# Patient Record
Sex: MALE | Race: WHITE | NOT HISPANIC OR LATINO | Employment: OTHER | URBAN - NONMETROPOLITAN AREA
[De-identification: names, ages, dates, MRNs, and addresses within clinical notes are randomized per-mention and may not be internally consistent; named-entity substitution may affect disease eponyms.]

---

## 2017-09-28 ENCOUNTER — COMMUNICATION - GICH (OUTPATIENT)
Dept: FAMILY MEDICINE | Facility: OTHER | Age: 62
End: 2017-09-28

## 2017-10-02 ENCOUNTER — HISTORY (OUTPATIENT)
Dept: FAMILY MEDICINE | Facility: OTHER | Age: 62
End: 2017-10-02

## 2017-10-02 ENCOUNTER — OFFICE VISIT - GICH (OUTPATIENT)
Dept: FAMILY MEDICINE | Facility: OTHER | Age: 62
End: 2017-10-02

## 2017-10-02 DIAGNOSIS — M25.50 PAIN IN JOINT: ICD-10-CM

## 2017-10-02 DIAGNOSIS — G60.9 HEREDITARY AND IDIOPATHIC NEUROPATHY: ICD-10-CM

## 2017-10-02 DIAGNOSIS — N42.9 DISORDER OF PROSTATE: ICD-10-CM

## 2017-10-02 DIAGNOSIS — F32.9 MAJOR DEPRESSIVE DISORDER, SINGLE EPISODE: ICD-10-CM

## 2017-10-02 DIAGNOSIS — M54.81 OCCIPITAL NEURALGIA: ICD-10-CM

## 2017-10-02 DIAGNOSIS — E55.9 VITAMIN D DEFICIENCY: ICD-10-CM

## 2017-10-02 DIAGNOSIS — F33.1 MAJOR DEPRESSIVE DISORDER, RECURRENT, MODERATE (H): ICD-10-CM

## 2017-10-02 DIAGNOSIS — R53.82 CHRONIC FATIGUE: ICD-10-CM

## 2017-10-02 LAB
A/G RATIO - HISTORICAL: 1.5 (ref 1–2)
ABSOLUTE BASOPHILS - HISTORICAL: 0 THOU/CU MM
ABSOLUTE EOSINOPHILS - HISTORICAL: 0.1 THOU/CU MM
ABSOLUTE IMMATURE GRANULOCYTES(METAS,MYELOS,PROS) - HISTORICAL: 0 THOU/CU MM
ABSOLUTE LYMPHOCYTES - HISTORICAL: 2.3 THOU/CU MM (ref 0.9–2.9)
ABSOLUTE MONOCYTES - HISTORICAL: 0.5 THOU/CU MM
ABSOLUTE NEUTROPHILS - HISTORICAL: 3.9 THOU/CU MM (ref 1.7–7)
ALBUMIN SERPL-MCNC: 4.7 G/DL (ref 3.5–5.7)
ALP SERPL-CCNC: 54 IU/L (ref 34–104)
ALT (SGPT) - HISTORICAL: 55 IU/L (ref 7–52)
ANION GAP - HISTORICAL: 11 (ref 5–18)
AST SERPL-CCNC: 29 IU/L (ref 13–39)
BASOPHILS # BLD AUTO: 0.6 %
BILIRUB SERPL-MCNC: 0.8 MG/DL (ref 0.3–1)
BUN SERPL-MCNC: 14 MG/DL (ref 7–25)
BUN/CREAT RATIO - HISTORICAL: 15
CALCIUM SERPL-MCNC: 10.1 MG/DL (ref 8.6–10.3)
CHLORIDE SERPLBLD-SCNC: 106 MMOL/L (ref 98–107)
CO2 SERPL-SCNC: 24 MMOL/L (ref 21–31)
CREAT SERPL-MCNC: 0.92 MG/DL (ref 0.7–1.3)
EOSINOPHIL NFR BLD AUTO: 1.9 %
ERYTHROCYTE [DISTWIDTH] IN BLOOD BY AUTOMATED COUNT: 11.6 % (ref 11.5–15.5)
GFR IF NOT AFRICAN AMERICAN - HISTORICAL: >60 ML/MIN/1.73M2
GLOBULIN - HISTORICAL: 3.2 G/DL (ref 2–3.7)
GLUCOSE SERPL-MCNC: 101 MG/DL (ref 70–105)
HCT VFR BLD AUTO: 44.1 % (ref 37–53)
HEMOGLOBIN: 16.1 G/DL (ref 13.5–17.5)
IMMATURE GRANULOCYTES(METAS,MYELOS,PROS) - HISTORICAL: 0.4 %
LYMPHOCYTES NFR BLD AUTO: 33.3 % (ref 20–44)
MCH RBC QN AUTO: 32.9 PG (ref 26–34)
MCHC RBC AUTO-ENTMCNC: 36.5 G/DL (ref 32–36)
MCV RBC AUTO: 90 FL (ref 80–100)
MONOCYTES NFR BLD AUTO: 6.6 %
NEUTROPHILS NFR BLD AUTO: 57.2 % (ref 42–72)
PLATELET # BLD AUTO: 210 THOU/CU MM (ref 140–440)
PMV BLD: 9.3 FL (ref 6.5–11)
POTASSIUM SERPL-SCNC: 4 MMOL/L (ref 3.5–5.1)
PROT SERPL-MCNC: 7.9 G/DL (ref 6.4–8.9)
PSA TOTAL (DIAGNOSTIC) - HISTORICAL: 0.57 NG/ML
RED BLOOD COUNT - HISTORICAL: 4.89 MIL/CU MM (ref 4.3–5.9)
SODIUM SERPL-SCNC: 141 MMOL/L (ref 133–143)
TSH - HISTORICAL: 2.45 UIU/ML (ref 0.34–5.6)
VIT B12 SERPL-MCNC: 435 PG/ML (ref 180–914)
VITAMIN D TOTAL - HISTORICAL: 23.3 NG/ML
WHITE BLOOD COUNT - HISTORICAL: 6.8 THOU/CU MM (ref 4.5–11)

## 2017-10-02 ASSESSMENT — ANXIETY QUESTIONNAIRES
2. NOT BEING ABLE TO STOP OR CONTROL WORRYING: NOT AT ALL
6. BECOMING EASILY ANNOYED OR IRRITABLE: SEVERAL DAYS
GAD7 TOTAL SCORE: 1
1. FEELING NERVOUS, ANXIOUS, OR ON EDGE: NOT AT ALL
4. TROUBLE RELAXING: NOT AT ALL
7. FEELING AFRAID AS IF SOMETHING AWFUL MIGHT HAPPEN: NOT AT ALL
5. BEING SO RESTLESS THAT IT IS HARD TO SIT STILL: NOT AT ALL
3. WORRYING TOO MUCH ABOUT DIFFERENT THINGS: NOT AT ALL

## 2017-10-02 ASSESSMENT — PATIENT HEALTH QUESTIONNAIRE - PHQ9: SUM OF ALL RESPONSES TO PHQ QUESTIONS 1-9: 11

## 2017-10-04 LAB — LYME SCREEN W/REFLEX WEST BLOT - HISTORICAL: NEGATIVE

## 2017-11-03 ENCOUNTER — AMBULATORY - GICH (OUTPATIENT)
Dept: SCHEDULING | Facility: OTHER | Age: 62
End: 2017-11-03

## 2017-12-28 NOTE — TELEPHONE ENCOUNTER
Patient Information     Patient Name MRN Sex Enzo Springer [id number] Male 1955      Telephone Encounter by Gabriel Jacob MD at 2017  5:23 PM     Author:  Gabriel Jacob MD Service:  (none) Author Type:  Physician     Filed:  2017  5:23 PM Encounter Date:  2017 Status:  Signed     :  Gabriel Jacob MD (Physician)            Agree  Gabriel Jacob MD ....................  2017   5:23 PM

## 2017-12-28 NOTE — TELEPHONE ENCOUNTER
Patient Information     Patient Name MRN Enzo Alvarez [id number] Male 1955      Telephone Encounter by Zayra Ortega at 2017  5:05 PM     Author:  Zayra Ortega Service:  (none) Author Type:  (none)     Filed:  2017  5:10 PM Encounter Date:  2017 Status:  Signed     :  Zayra Ortega            The patient stated he has had strange headaches, brain fog, no energy and feeling fatiged. He was wondering if he could get some lab work done for this before his appointment. He was told that he should probably come in for an appointment and then do the labs so the Dr can evaluate him first and see exactly what labs he would need.  Zayra Ortega LPN..................2017   5:09 PM

## 2017-12-30 NOTE — NURSING NOTE
Patient Information     Patient Name MRN Enzo Alvarez [id number] Male 1955      Nursing Note by Zayra Ortega at 10/2/2017 11:30 AM     Author:  Zayra Ortega Service:  (none) Author Type:  (none)     Filed:  10/2/2017 11:58 AM Encounter Date:  10/2/2017 Status:  Signed     :  Zayra Ortega            He has had a constant dull headache, fatigue, having trouble concentrating and getting his thoughts in order. This has gone on for 6 months but worse over past month.  Zayra Ortega LPN..................10/2/2017   11:41 AM

## 2018-01-27 VITALS
SYSTOLIC BLOOD PRESSURE: 124 MMHG | HEART RATE: 64 BPM | BODY MASS INDEX: 25.47 KG/M2 | HEIGHT: 72 IN | DIASTOLIC BLOOD PRESSURE: 84 MMHG | WEIGHT: 188 LBS

## 2018-01-30 ENCOUNTER — DOCUMENTATION ONLY (OUTPATIENT)
Dept: FAMILY MEDICINE | Facility: OTHER | Age: 63
End: 2018-01-30

## 2018-01-30 PROBLEM — G60.9 PERIPHERAL NEUROPATHY, IDIOPATHIC: Status: ACTIVE | Noted: 2017-10-02

## 2018-01-30 PROBLEM — F33.1 MAJOR DEPRESSIVE DISORDER, RECURRENT EPISODE, MODERATE (H): Status: ACTIVE | Noted: 2018-01-30

## 2018-01-30 PROBLEM — F34.1 DYSTHYMIC DISORDER: Status: ACTIVE | Noted: 2018-01-30

## 2018-01-30 RX ORDER — ERGOCALCIFEROL 1.25 MG/1
50000 CAPSULE, LIQUID FILLED ORAL
COMMUNITY
Start: 2017-10-02 | End: 2018-11-14

## 2018-02-05 ASSESSMENT — ANXIETY QUESTIONNAIRES: GAD7 TOTAL SCORE: 1

## 2018-02-05 ASSESSMENT — PATIENT HEALTH QUESTIONNAIRE - PHQ9: SUM OF ALL RESPONSES TO PHQ QUESTIONS 1-9: 11

## 2018-07-23 NOTE — PROGRESS NOTES
Patient Information     Patient Name  Enzo Miguel MRN  [id number] Sex  Male   1955      Letter by Gabriel Jacob MD at      Author:  Gabriel Jacob MD Service:  (none) Author Type:  (none)    Filed:   Encounter Date:  10/2/2017 Status:  (Other)           Enzo Miguel  [address]  [place] [place] [facility] [address]          2017    Dear Jeanmarie:    Your lab tests are back.  Your complete blood count was normal. Your comprehensive metabolic panel (a test that looks at liver and kidney function, blood sugar, electrolytes, and nutritional status) was normal.  Your thyroid was normal.  Your prostate test was normal.    Your vitamin B 12 is normal, but you vitamin D is low.  This may make you experience some of the symptoms you have been having.  I will send a prescription for high dose vitamin D twice weekly for 8 weeks.  Once that prescription is done, you should take 2000 units of vitamin D daily on an ongoing basis.    The labs we sent to the reference lab are not back yet and I'll let you know those results when available.     It was a pleasure seeing you the other day.  If you have any questions, please don't hesitate to call us.     Sincerely,          Gabriel Jacob MD

## 2018-07-24 NOTE — PROGRESS NOTES
Patient Information     Patient Name  Enzo Miguel MRN  [id number] Sex  Male   1955      Letter by Gabriel Jacob MD at      Author:  Gabriel Jacob MD Service:  (none) Author Type:  (none)    Filed:   Encounter Date:  10/2/2017 Status:  (Other)           Enzo Miguel  [address] [facility] [address]          2017    Dear Jeanmarie:    Your Lyme test is negative.     It was a pleasure seeing you the other day.  If you have any questions, please don't hesitate to call us.     Sincerely,          Gabriel Jacob MD

## 2018-11-14 ENCOUNTER — OFFICE VISIT (OUTPATIENT)
Dept: FAMILY MEDICINE | Facility: OTHER | Age: 63
End: 2018-11-14
Attending: FAMILY MEDICINE

## 2018-11-14 VITALS
WEIGHT: 204.5 LBS | HEIGHT: 71 IN | SYSTOLIC BLOOD PRESSURE: 134 MMHG | DIASTOLIC BLOOD PRESSURE: 86 MMHG | BODY MASS INDEX: 28.63 KG/M2 | HEART RATE: 72 BPM | RESPIRATION RATE: 16 BRPM | TEMPERATURE: 97.1 F

## 2018-11-14 DIAGNOSIS — G60.9 PERIPHERAL NEUROPATHY, IDIOPATHIC: ICD-10-CM

## 2018-11-14 DIAGNOSIS — Z13.6 SCREENING FOR CARDIOVASCULAR CONDITION: ICD-10-CM

## 2018-11-14 DIAGNOSIS — F32.A DEPRESSION, PROLONGED: Primary | ICD-10-CM

## 2018-11-14 DIAGNOSIS — F33.1 MAJOR DEPRESSIVE DISORDER, RECURRENT EPISODE, MODERATE (H): ICD-10-CM

## 2018-11-14 PROCEDURE — 99213 OFFICE O/P EST LOW 20 MIN: CPT | Performed by: FAMILY MEDICINE

## 2018-11-14 ASSESSMENT — PATIENT HEALTH QUESTIONNAIRE - PHQ9: SUM OF ALL RESPONSES TO PHQ QUESTIONS 1-9: 12

## 2018-11-14 ASSESSMENT — PAIN SCALES - GENERAL: PAINLEVEL: NO PAIN (0)

## 2018-11-14 NOTE — PROGRESS NOTES
There are no exam notes on file for this visit.    SUBJECTIVE:  Enzo Miguel  is a 63 year old male who comes in today for follow-up and completion of paperwork for disability due to severe recalcitrant depression.  I last saw him a little over a year ago.  We did a workup for chronic fatigue and did not find any reversible causes with the exception of vitamin D deficiency.  He was treated with high-dose vitamin D and was to continue with regular vitamin D daily after that.  He seemed to help after a while.     Of late and over the summer he had low energy and he had to push himself to do much of anything.  He has been pretty sedentary.  [place]  When he was doing activity at home, he would have some unusual arm symptoms. A bit of a numb sensation down the inside of his arms or a heaviness to a dull ache.  It seemed to come on with something physical.     He has some family history of heart disease in his grandfather.     Past Medical, Family, and Social History reviewed and updated as noted below.   ROS is negative except as noted above       No Known Allergies,   Family History   Problem Relation Age of Onset     Other - See Comments Father      Psychiatric illness,committed suicide     Other - See Comments Paternal Grandmother      Psychiatric illness,committed suicide     Other - See Comments Other      Psychiatric illness,Depression     HEART DISEASE Other      Heart Disease     Thyroid Disease Other      Thyroid Disease     Other - See Comments Sister      Psychiatric illness,committed suicide     Other - See Comments Brother      Psychiatric illness,committed suicide     Substance Abuse Brother      Alcohol/Drug,Chemical dependency issues   ,   Current Outpatient Prescriptions   Medication     Cholecalciferol (VITAMIN D3) 5000 UNIT/ML LIQD     No current facility-administered medications for this visit.    , History reviewed. No pertinent past medical history.,   Patient  "Active Problem List    Diagnosis Date Noted     Dysthymic disorder 01/30/2018     Priority: Medium     Major depressive disorder, recurrent episode, moderate (H) 01/30/2018     Priority: Medium     Peripheral neuropathy, idiopathic 10/02/2017     Priority: Medium     Depression, prolonged 09/30/2003     Priority: Medium     Overview:   Major depressive episode with hospitalization.     ,   Past Surgical History:   Procedure Laterality Date     OTHER SURGICAL HISTORY      36943.0,PAST SURGICAL HISTORY,Unremarkable    and   Social History   Substance Use Topics     Smoking status: Former Smoker     Types: Cigarettes     Quit date: 1/1/1980     Smokeless tobacco: Former User     Quit date: 1/1/2013     Alcohol use Yes      Comment: Alcoholic Drinks/day: 3-4 times a week     OBJECTIVE:  /86  Pulse 72  Temp 97.1  F (36.2  C) (Tympanic)  Resp 16  Ht 5' 11\" (1.803 m)  Wt 204 lb 8 oz (92.8 kg)  BMI 28.52 kg/m2   EXAM:  Alert and cooperative, no distress.  Affect is flat.  No formal thought disorder.  Lungs are clear, no rales rhonchi or wheezes are heard.  Cardiac RRR without murmur.  Normal peripheral pulses.    PHQ-9 SCORE 8/9/2016 10/2/2017 11/14/2018   Total Score 5 11 12     CARLOS-7 SCORE 3/17/2015 8/9/2016 10/2/2017   Total Score 7 2 1         ASSESSMENT/Plan :    Enzo was seen today for forms.    Diagnoses and all orders for this visit:    Depression, prolonged    Screening for cardiovascular condition  -     CT Coronary Calcium Scan; Future    Major depressive disorder, recurrent episode, moderate (H)    Peripheral neuropathy, idiopathic      Forms completed for [employer] insurance for disability.  Exertional symptoms are nonspecific and possibly due to deconditioning but given family history  And sedentary lifestyle inguinal he really does not have other risk factors, feel that it would be reasonable to pursue at least a CT coronary calcium score.  If there is any calcium at all would then move " ahead with a functional stress test.  If his score is 0, would be okay to try and see if he could get a little bit better physical condition.    He is not interested in pursuing further evaluation or medications for his depression as he has exhausted the gamut other than ECT and he is not interested in pursuing that at this time.    A total of 15 minutes was spent with the patient, greater than 50% of the time was spent in counseling/discussion of the aforementioned concerns.     Gabriel Jacob MD

## 2018-11-14 NOTE — MR AVS SNAPSHOT
After Visit Summary   11/14/2018    Enzo Miguel    MRN: [id number]           Patient Information     Date Of Birth          1955        Visit Information        Provider Department      11/14/2018 11:30 AM Gabriel Jacob MD [facility]        Today's Diagnoses     Depression, prolonged    -  1    Screening for cardiovascular condition        Major depressive disorder, recurrent episode, moderate (H)        Peripheral neuropathy, idiopathic           Follow-ups after your visit        Your next 10 appointments already scheduled     Nov 15, 2018  3:30 PM CST   (Arrive by 3:15 PM)   CT CALCIUM SCREENING with GHCT1   [facility]    [address]  [place] [address]   [phone]           How do I prepare for my exam? (Food and drink instructions) It is best to avoid caffeine on the day of your test.  What should I wear: Please wear loose clothing, such as a sweat suit or jogging clothes. Avoid snaps, zippers and other metal. We may ask you to undress and put on a hospital gown.  How long does the exam take: The entire exam will take about 30 minutes or less.  What should I bring: Please bring a list of your current medicines to your exam. (Include vitamins, minerals and over-the-counter medicines.) It is safest to leave personal items at home.  Do I need a : No  is needed.  What do I need to tell my doctor: Be sure to tell your doctor if there is a chance you may be pregnant.  What should I do after the exam: No restrictions, You may resume normal activities.  What is this test: A calcium scoring CT (computed tomography) scan is a painless, highly sensitive test that shows the amount of calcium build-up in your heart arteries. This tells us your future risk for heart attack. The CT scan is a series of pictures that allows us to look at your heart. Our scanner creates images of the heart in  "cross sections, much like slices of bread. A heart scan should not take the place of your routine doctor visits.  Who should I call with questions: If you have any questions, please call the Imaging Department where you will have your exam. Directions, parking instructions, and other information is available on our website, [email or web address].              Future tests that were ordered for you today     Open Future Orders        Priority Expected Expires Ordered    CT Coronary Calcium Scan Routine  11/14/2019 11/14/2018            Who to contact     If you have questions or need follow up information about today's clinic visit or your schedule please contact [facility] AND [facility] directly at [phone].  Normal or non-critical lab and imaging results will be communicated to you by MyChart, letter or phone within 4 business days after the clinic has received the results. If you do not hear from us within 7 days, please contact the clinic through MyChart or phone. If you have a critical or abnormal lab result, we will notify you by phone as soon as possible.  Submit refill requests through [place] or call your pharmacy and they will forward the refill request to us. Please allow 3 business days for your refill to be completed.          Additional Information About Your Visit        Care EveryWhere ID     This is your Care EveryWhere ID. This could be used by other organizations to access your [facility] medical records  [id number]        Your Vitals Were     Pulse Temperature Respirations Height BMI (Body Mass Index)       72 97.1  F (36.2  C) (Tympanic) 16 5' 11\" (1.803 m) 28.52 kg/m2        Blood Pressure from Last 3 Encounters:   11/14/18 134/86   10/02/17 124/84   08/09/16 114/84    Weight from Last 3 Encounters:   11/14/18 204 lb 8 oz (92.8 kg)   10/02/17 188 lb (85.3 kg)   08/09/16 188 lb 6.4 oz (85.5 kg)               Primary Care Provider Office Phone # Fax #    Gabriel TOMLIN [phone] " [phone]       [address]   [place] [facility] [address]        Equal Access to Services     ARELI NIELSON : Hadii aad ku hadcarleneelodia De La Vega, wayessicajune tolentinogailha, markellfrida buitragokelinjune foreman, ash powersmerylparish arias. So [facility] [phone].    ATENCIÓN: Si habla español, tiene a rea disposición servicios gratuitos de asistencia lingüística. Llame al [phone].    We comply with applicable federal civil rights laws and [place] laws. We do not discriminate on the basis of race, color, national origin, age, disability, sex, sexual orientation, or gender identity.            Thank you!     Thank you for choosing [facility] AND [facility]  for your care. Our goal is always to provide you with excellent care. Hearing back from our patients is one way we can continue to improve our services. Please take a few minutes to complete the written survey that you may receive in the mail after your visit with us. Thank you!             Your Updated Medication List - Protect others around you: Learn how to safely use, store and throw away your medicines at www.disposemymeds.org.          This list is accurate as of 11/14/18  6:28 PM.  Always use your most recent med list.                   Brand Name Dispense Instructions for use Diagnosis    vitamin D3 5000 UNIT/ML Liqd      Take 5,000 Units by mouth daily

## 2018-11-15 ENCOUNTER — HOSPITAL ENCOUNTER (OUTPATIENT)
Dept: CT IMAGING | Facility: OTHER | Age: 63
Discharge: HOME | End: 2018-11-15
Attending: FAMILY MEDICINE | Admitting: FAMILY MEDICINE

## 2018-11-15 DIAGNOSIS — I25.84 CORONARY ARTERY DISEASE DUE TO CALCIFIED CORONARY LESION: ICD-10-CM

## 2018-11-15 DIAGNOSIS — Z13.6 SCREENING FOR CARDIOVASCULAR CONDITION: ICD-10-CM

## 2018-11-15 DIAGNOSIS — R07.89 ATYPICAL CHEST PAIN: Primary | ICD-10-CM

## 2018-11-15 DIAGNOSIS — I25.10 CORONARY ARTERY DISEASE DUE TO CALCIFIED CORONARY LESION: ICD-10-CM

## 2018-11-15 PROCEDURE — 75571 CT HRT W/O DYE W/CA TEST: CPT

## 2018-11-26 ENCOUNTER — OFFICE VISIT (OUTPATIENT)
Dept: INTERNAL MEDICINE | Facility: OTHER | Age: 63
End: 2018-11-26
Attending: FAMILY MEDICINE

## 2018-11-26 VITALS
HEART RATE: 76 BPM | DIASTOLIC BLOOD PRESSURE: 84 MMHG | HEIGHT: 71 IN | BODY MASS INDEX: 28.73 KG/M2 | TEMPERATURE: 97.6 F | SYSTOLIC BLOOD PRESSURE: 136 MMHG | RESPIRATION RATE: 18 BRPM | WEIGHT: 205.25 LBS

## 2018-11-26 DIAGNOSIS — I25.10 CORONARY ARTERY CALCIFICATION: ICD-10-CM

## 2018-11-26 DIAGNOSIS — I25.84 CORONARY ARTERY DISEASE DUE TO CALCIFIED CORONARY LESION: ICD-10-CM

## 2018-11-26 DIAGNOSIS — I25.10 CORONARY ARTERY DISEASE DUE TO CALCIFIED CORONARY LESION: ICD-10-CM

## 2018-11-26 DIAGNOSIS — R07.89 ATYPICAL CHEST PAIN: Primary | ICD-10-CM

## 2018-11-26 LAB
CHOLEST SERPL-MCNC: 296 MG/DL
HDLC SERPL-MCNC: 54 MG/DL (ref 23–92)
LDLC SERPL CALC-MCNC: 215 MG/DL
NONHDLC SERPL-MCNC: 242 MG/DL
TRIGL SERPL-MCNC: 136 MG/DL

## 2018-11-26 PROCEDURE — 80061 LIPID PANEL: CPT | Performed by: INTERNAL MEDICINE

## 2018-11-26 PROCEDURE — 93010 ELECTROCARDIOGRAM REPORT: CPT | Performed by: INTERNAL MEDICINE

## 2018-11-26 PROCEDURE — 36415 COLL VENOUS BLD VENIPUNCTURE: CPT | Performed by: INTERNAL MEDICINE

## 2018-11-26 PROCEDURE — 99244 OFF/OP CNSLTJ NEW/EST MOD 40: CPT | Performed by: INTERNAL MEDICINE

## 2018-11-26 ASSESSMENT — ENCOUNTER SYMPTOMS
SINUS PAIN: 0
SPEECH DIFFICULTY: 0
JOINT SWELLING: 0
DIZZINESS: 0
WHEEZING: 0
HEADACHES: 0
DIFFICULTY URINATING: 0
APPETITE CHANGE: 0
SINUS PRESSURE: 0
DIAPHORESIS: 0
COUGH: 0
WOUND: 0
HEMATURIA: 0
ABDOMINAL PAIN: 0
RHINORRHEA: 0
NECK STIFFNESS: 0
FREQUENCY: 0
SHORTNESS OF BREATH: 1
VOMITING: 0
EYE PAIN: 0
PALPITATIONS: 0
TROUBLE SWALLOWING: 0
DIARRHEA: 0
NAUSEA: 0
NECK PAIN: 0
SORE THROAT: 0
NERVOUS/ANXIOUS: 0
AGITATION: 0
SLEEP DISTURBANCE: 0
TREMORS: 0
FLANK PAIN: 0
DYSURIA: 0
CONSTIPATION: 0
ADENOPATHY: 0
CONFUSION: 0
HALLUCINATIONS: 0
BRUISES/BLEEDS EASILY: 0
UNEXPECTED WEIGHT CHANGE: 0
LIGHT-HEADEDNESS: 0
VOICE CHANGE: 0
BLOOD IN STOOL: 0
CHEST TIGHTNESS: 1
WEAKNESS: 0
FATIGUE: 0
EYE REDNESS: 0
SEIZURES: 0
BACK PAIN: 0
CHILLS: 0
ABDOMINAL DISTENTION: 0
NUMBNESS: 0
FEVER: 0

## 2018-11-26 ASSESSMENT — PAIN SCALES - GENERAL: PAINLEVEL: NO PAIN (0)

## 2018-11-26 NOTE — LETTER
November 26, 2018      Enzo Miguel  [address] [address]        Dear Enzo Miguel,    Below are the results of your recent labs:    Results for orders placed or performed in visit on 11/26/18   Lipid Panel   Result Value Ref Range    Cholesterol 296 (H) <200 mg/dL    Triglycerides 136 <150 mg/dL    HDL Cholesterol 54 23 - 92 mg/dL    LDL Cholesterol Calculated 215 (H) <100 mg/dL    Non HDL Cholesterol 242 (H) <130 mg/dL   EKG 12-LEAD CLINIC READ    Impression    Normal EKG with a rate of 82.  Elvin Beal MD          Your cholesterol level is very high.  You will need to be on a cholesterol-lowering medication pending the results of your stress test.    Sincerely,        Elvin Beal MD  Internal Medicine  [facility]

## 2018-11-26 NOTE — PROGRESS NOTES
Chief Complaint:  This patient is here for a consultation regarding chest pain and abnormal CT.    HPI: Consultation is requested by Dr. Jacob.  This patient has been having some chest discomfort associated with a slightly abnormal CT calcium score.  His chest discomfort is probably started at the beginning of November.  He would notice kind of a chest heaviness or ache with some bilateral arm aching.  Sometimes it would happen when he was doing things, sometimes it happens just at rest, even in the middle of the night.  He does not have shortness of breath, nausea or diaphoresis with this.  Because of his symptoms he did have a CT calcium score that was slightly elevated at 42.6, primarily LAD calcification.  No other abnormalities were seen.  He is here today to discuss this and decide whether anything further needs to be done.    The patient admits that he has a long-standing history of depression.  He also has some anxiety or panic and he worries about his health.  He does not know what his cholesterol level is.  He has smoked cigars in the past.  He does not have hypertension or obesity.  He does not have diabetes.  Family history is negative for premature atherosclerosis, is reviewed and listed as below.    He is not on any medications other than some occasional vitamin D.  Past medical history, past surgical history, family history and social histories are all reviewed and updated.  He is deficient on health issues including colonoscopy, etc.    Past Medical History:   Diagnosis Date     Coronary artery calcification      Depression      Vitamin D deficiency        No past surgical history on file.    Current Outpatient Prescriptions   Medication Sig Dispense Refill     Cholecalciferol (VITAMIN D3) 5000 UNIT/ML LIQD Take 5,000 Units by mouth daily         No Known Allergies    Family History   Problem Relation Age of Onset     Other - See Comments Father      Psychiatric illness,committed suicide     Lung  Cancer Father      Also had bladder and prostate cancer     Other - See Comments Paternal Grandmother      Psychiatric illness,committed suicide     Other - See Comments Other      Psychiatric illness,Depression     HEART DISEASE Other      Heart Disease     Thyroid Disease Other      Thyroid Disease     Other - See Comments Sister        Social History     Social History     Marital status:      Spouse name: N/A     Number of children: N/A     Years of education: N/A     Occupational History     Not on file.     Social History Main Topics     Smoking status: Former Smoker     Types: Cigars     Quit date: 1/1/1980     Smokeless tobacco: Former User     Quit date: 1/1/2013     Alcohol use Yes      Comment: Alcoholic Drinks/day: 3-4 times a week     Drug use: No      Comment: Drug use: No     Sexual activity: Not on file     Other Topics Concern     Not on file     Social History Narrative    Two children Jaylan and Randell    Spouse Camelia    .      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     He is on disability for depression.           Review of Systems   Constitutional: Negative for appetite change, chills, diaphoresis, fatigue, fever and unexpected weight change.   HENT: Negative for ear pain, rhinorrhea, sinus pain, sinus pressure, sore throat, trouble swallowing and voice change.    Eyes: Negative for pain, redness and visual disturbance.   Respiratory: Positive for chest tightness and shortness of breath. Negative for cough and wheezing.    Cardiovascular: Positive for chest pain. Negative for palpitations and leg swelling.   Gastrointestinal: Negative for abdominal distention, abdominal pain, blood in stool, constipation, diarrhea, nausea and vomiting.   Endocrine: Negative for cold intolerance and heat intolerance.   Genitourinary: Negative for difficulty urinating, dysuria, flank pain, frequency and hematuria.   Musculoskeletal: Negative for back pain, joint swelling, neck pain and neck stiffness.   Skin:  Negative for rash and wound.   Allergic/Immunologic: Negative for immunocompromised state.   Neurological: Negative for dizziness, tremors, seizures, syncope, speech difficulty, weakness, light-headedness, numbness and headaches.   Hematological: Negative for adenopathy. Does not bruise/bleed easily.   Psychiatric/Behavioral: Negative for agitation, behavioral problems, confusion, hallucinations and sleep disturbance. The patient is not nervous/anxious.        Physical Exam   Constitutional: He appears well-developed and well-nourished. No distress.   Neck: Normal range of motion. Neck supple. Normal carotid pulses present. Carotid bruit is not present. No thyromegaly present.   Cardiovascular: Normal rate, regular rhythm and normal heart sounds.  Exam reveals no gallop and no friction rub.    No murmur heard.  Pulmonary/Chest: Effort normal and breath sounds normal. No respiratory distress. He has no wheezes. He has no rales.   Abdominal: Soft. Bowel sounds are normal. He exhibits no distension. There is no tenderness.   Musculoskeletal: He exhibits no edema.   Lymphadenopathy:     He has no cervical adenopathy.   Neurological: He is alert.   Skin: Skin is warm and dry. He is not diaphoretic.   Nursing note and vitals reviewed.      Assessment:      ICD-10-CM    1. Atypical chest pain R07.89 EKG 12-LEAD CLINIC READ   2. Coronary artery disease due to calcified coronary lesion I25.10     I25.84    3. Coronary artery calcification I25.10     I25.84         Plan: This patient has some mild coronary artery calcification seen on recent CT scanning.  This is associated with some atypical chest discomfort.  His exam today and EKG are both normal.  It seems reasonable to proceed with further testing to assess for obstructive coronary artery disease.  We will plan to do a stress echo with Definity in the near future.  In addition, lipid panel is drawn and pending, if elevated he should definitely be on statin therapy.  I  will send him a letter with the results.  Recommend aspirin therapy daily at least until the results of the stress test.

## 2018-11-26 NOTE — MR AVS SNAPSHOT
"              After Visit Summary   11/26/2018    Enzo Miguel    MRN: [id number]           Patient Information     Date Of Birth          1955        Visit Information        Provider Department      11/26/2018 7:40 AM Elvin Beal MD [facility] [place] [facility]        Today's Diagnoses     Atypical chest pain    -  1    Coronary artery disease due to calcified coronary lesion        Coronary artery calcification           Follow-ups after your visit        Future tests that were ordered for you today     Open Future Orders        Priority Expected Expires Ordered    Echo Stress Test With Definity Routine  11/26/2019 11/26/2018    Lipid Panel Routine  11/26/2019 11/26/2018            Who to contact     If you have questions or need follow up information about today's clinic visit or your schedule please contact [facility] directly at [phone].  Normal or non-critical lab and imaging results will be communicated to you by MyChart, letter or phone within 4 business days after the clinic has received the results. If you do not hear from us within 7 days, please contact the clinic through MyChart or phone. If you have a critical or abnormal lab result, we will notify you by phone as soon as possible.  Submit refill requests through [place] or call your pharmacy and they will forward the refill request to us. Please allow 3 business days for your refill to be completed.          Additional Information About Your Visit        Care EveryWhere ID     This is your Care EveryWhere ID. This could be used by other organizations to access your [place] medical records  [id number]        Your Vitals Were     Pulse Temperature Respirations Height BMI (Body Mass Index)       76 97.6  F (36.4  C) (Tympanic) 18 5' 11\" (1.803 m) 28.63 kg/m2        Blood Pressure from Last 3 Encounters:   11/26/18 (!) 144/92   11/14/18 134/86   10/02/17 124/84    Weight from Last 3 Encounters:   11/26/18 205 " lb 4 oz (93.1 kg)   11/14/18 204 lb 8 oz (92.8 kg)   10/02/17 188 lb (85.3 kg)              We Performed the Following     EKG 12-LEAD CLINIC READ        Primary Care Provider Office Phone # Fax #    Gabriel TOMLIN [phone] [address]  [place] [address]        Equal Access to Services     [facility]: Hadii aad ku hadasho Soomaali, waaxda luqadaha, qaybta kaalmada adeegyada, waxay idiin hayaan adeeg kharash la'aan ah. So [facility] [phone].    ATENCIÓN: Si habla franklin, tiene a rea disposición servicios gratuitos de asistencia lingüística. Llame al [phone].    We comply with applicable federal civil rights laws and [place] laws. We do not discriminate on the basis of race, color, national origin, age, disability, sex, sexual orientation, or gender identity.            Thank you!     Thank you for choosing [facility] AND [facility]  for your care. Our goal is always to provide you with excellent care. Hearing back from our patients is one way we can continue to improve our services. Please take a few minutes to complete the written survey that you may receive in the mail after your visit with us. Thank you!             Your Updated Medication List - Protect others around you: Learn how to safely use, store and throw away your medicines at www.disposemymeds.org.          This list is accurate as of 11/26/18  8:20 AM.  Always use your most recent med list.                   Brand Name Dispense Instructions for use Diagnosis    aspirin 325 MG EC tablet    ASA    90 tablet    Take 1 tablet (325 mg) by mouth daily        vitamin D3 5000 UNIT/ML Liqd      Take 5,000 Units by mouth daily

## 2018-11-26 NOTE — NURSING NOTE
Patient presents to clinic for consult with Internal Medicine after experiencing chest pain radiating down both arms.    Medication Reconciliation: complete    Viri Forrester LPN............11/26/2018 7:45 AM

## 2018-12-05 ENCOUNTER — TELEPHONE (OUTPATIENT)
Dept: INTERNAL MEDICINE | Facility: OTHER | Age: 63
End: 2018-12-05

## 2018-12-05 NOTE — TELEPHONE ENCOUNTER
There are no options that I am aware of that would be cheaper, all other options would be more expensive.  It is his choice as to whether to proceed or not.

## 2018-12-05 NOTE — TELEPHONE ENCOUNTER
Patient is wondering if there are other options for stress test he can try, states with everything so far is over $3000.   Amarilys Park LPN .............12/5/2018     11:00 AM

## 2018-12-05 NOTE — TELEPHONE ENCOUNTER
MAF - Patient is concerned with the cost of a stress test and would like to discuss stress test options with nurse.

## 2019-05-20 ENCOUNTER — TRANSFERRED RECORDS (OUTPATIENT)
Dept: HEALTH INFORMATION MANAGEMENT | Facility: OTHER | Age: 64
End: 2019-05-20

## 2019-05-29 ENCOUNTER — TRANSFERRED RECORDS (OUTPATIENT)
Dept: HEALTH INFORMATION MANAGEMENT | Facility: OTHER | Age: 64
End: 2019-05-29

## 2019-05-30 ENCOUNTER — TRANSFERRED RECORDS (OUTPATIENT)
Dept: HEALTH INFORMATION MANAGEMENT | Facility: OTHER | Age: 64
End: 2019-05-30

## 2019-06-05 ENCOUNTER — TELEPHONE (OUTPATIENT)
Dept: FAMILY MEDICINE | Facility: OTHER | Age: 64
End: 2019-06-05

## 2019-06-05 ASSESSMENT — PATIENT HEALTH QUESTIONNAIRE - PHQ9: SUM OF ALL RESPONSES TO PHQ QUESTIONS 1-9: 8

## 2019-06-05 NOTE — TELEPHONE ENCOUNTER
Called patient and administered PHQ-9.  Patient scored 8.  Regarding Panel Management Protocol this writer will now route to patient's PCP to review.  Sarahy Jarquin LPN 6/5/2019   12:56 PM

## 2019-06-05 NOTE — TELEPHONE ENCOUNTER
Panel Management Review      Patient has the following on his problem list:     Depression / Dysthymia review    Measure:  Needs PHQ-9 score of 4 or less during index window.  Administer PHQ-9 and if score is 5 or more, send encounter to provider for next steps.      PHQ-9 SCORE 8/9/2016 10/2/2017 11/14/2018   PHQ-9 Total Score 5 11 12       If PHQ-9 recheck is 5 or more, route to provider for next steps.    Patient is due for:  PHQ9      Composite cancer screening  Chart review shows that this patient is due/due soon for the following None  Summary:    Patient is due/failing the following:   PHQ9    Action needed:   Patient needs to do PHQ9.    Type of outreach:    Phone, spoke to patient.  SJW    Questions for provider review:    None                                                                                                                                    Sarahy Jarquin LPN 6/5/2019   12:51 PM       Chart routed to Care Team .

## 2019-11-07 ENCOUNTER — TRANSFERRED RECORDS (OUTPATIENT)
Dept: HEALTH INFORMATION MANAGEMENT | Facility: OTHER | Age: 64
End: 2019-11-07

## 2019-11-18 ENCOUNTER — OFFICE VISIT (OUTPATIENT)
Dept: FAMILY MEDICINE | Facility: OTHER | Age: 64
End: 2019-11-18
Attending: FAMILY MEDICINE
Payer: COMMERCIAL

## 2019-11-18 VITALS
DIASTOLIC BLOOD PRESSURE: 82 MMHG | RESPIRATION RATE: 16 BRPM | HEIGHT: 71 IN | SYSTOLIC BLOOD PRESSURE: 128 MMHG | HEART RATE: 79 BPM | BODY MASS INDEX: 29.29 KG/M2 | WEIGHT: 209.2 LBS | OXYGEN SATURATION: 98 % | TEMPERATURE: 97.7 F

## 2019-11-18 DIAGNOSIS — F33.1 MAJOR DEPRESSIVE DISORDER, RECURRENT EPISODE, MODERATE (H): ICD-10-CM

## 2019-11-18 DIAGNOSIS — F32.A DEPRESSION, PROLONGED: Primary | ICD-10-CM

## 2019-11-18 DIAGNOSIS — I25.10 CORONARY ARTERY DISEASE INVOLVING NATIVE CORONARY ARTERY OF NATIVE HEART WITHOUT ANGINA PECTORIS: ICD-10-CM

## 2019-11-18 PROCEDURE — 99213 OFFICE O/P EST LOW 20 MIN: CPT | Performed by: FAMILY MEDICINE

## 2019-11-18 RX ORDER — ASPIRIN 81 MG/1
81 TABLET, CHEWABLE ORAL DAILY
COMMUNITY

## 2019-11-18 RX ORDER — CLOPIDOGREL BISULFATE 75 MG/1
75 TABLET ORAL DAILY
COMMUNITY
End: 2020-07-13

## 2019-11-18 RX ORDER — METOPROLOL SUCCINATE 25 MG/1
12.5 TABLET, EXTENDED RELEASE ORAL DAILY
COMMUNITY
End: 2020-07-13

## 2019-11-18 ASSESSMENT — MIFFLIN-ST. JEOR: SCORE: 1761.05

## 2019-11-18 ASSESSMENT — PAIN SCALES - GENERAL: PAINLEVEL: NO PAIN (0)

## 2019-11-18 NOTE — NURSING NOTE
Chief Complaint   Patient presents with     RECHECK     disabilty insurance    Patient presents to the clinic today for a follow up on disability insurance.    Medication Reconciliation: completed   Sobeida Garcia LPN  11/18/2019 3:02 PM

## 2019-11-18 NOTE — PROGRESS NOTES
Nursing Notes:   EnriqueSobeida bolden, LPN  11/18/2019  3:22 PM  Signed  Chief Complaint   Patient presents with     RECHECK     disabilty insurance    Patient presents to the clinic today for a follow up on disability insurance.    Medication Reconciliation: completed   Sobeida Garcia, LPN  11/18/2019 3:02 PM     SUBJECTIVE:  Enzo Miguel  is a 64 year old male who comes in today for completion of his disability form for insurance.  He has a prolonged major depression which has been disabling, severe, recalcitrant.  I last saw him a year ago.  He has exhausted all his options other than ECT and is not interested in pursuing that.    We were concerned that he was having some exertional symptoms and atypical chest pain.  He was sent for a CT calcium score which was elevated at 42.6.  We sent him to Dr. Beal for consultation and a stress test was recommended. He did not want to do a because of cost.  About 6 months later when he was still having symptoms he went to [facility] and saw a nurse practitioner who recommended a stress test.  He had a stress echo that was positive for inducible ischemia with global hypokinesis and a dyskinetic apex and LV consistent with multivessel disease.  He underwent urgent cardiac angiography showing a proximal LAD with a severe 95% stenosis for which he underwent PCI and stenting.  He had a drug-eluting stent placed and was placed on dual platelet therapy with aspirin and Plavix for a year.  He is on 20 mg of Lipitor and 12.5 mg of metoprolol tartrate twice daily.  He did cardiac rehab.  He saw cardiology last week and they discussed decreasing or stopping metoprolol.  They ordered an echocardiogram. He is now on 12.5 mg daily of metoprolol succinate.  He thinks maybe this helped a little bit.  He has had multiple work-ups for chronic fatigue without any reversible causes other than vitamin D deficiency.  He still describes the same type of symptoms that he has over the last several  years.    Past Medical, Family, and Social History reviewed and updated as noted below.   ROS is negative except as noted above       No Known Allergies,   Family History   Problem Relation Age of Onset     Other - See Comments Father         Psychiatric illness,committed suicide     Lung Cancer Father         Also had bladder and prostate cancer     Other - See Comments Paternal Grandmother         Psychiatric illness,committed suicide     Other - See Comments Other         Psychiatric illness,Depression     Heart Disease Other         Heart Disease     Thyroid Disease Other         Thyroid Disease     Other - See Comments Sister    ,   Current Outpatient Medications   Medication     aspirin (ASA) 81 MG chewable tablet     clopidogrel (PLAVIX) 75 MG tablet     metoprolol succinate ER (TOPROL-XL) 25 MG 24 hr tablet     Cholecalciferol (VITAMIN D3) 5000 UNIT/ML LIQD     No current facility-administered medications for this visit.    ,   Past Medical History:   Diagnosis Date     Coronary artery calcification      Depression      Vitamin D deficiency    ,   Patient Active Problem List    Diagnosis Date Noted     Coronary artery calcification      Priority: Medium     Dysthymic disorder 2018     Priority: Medium     Major depressive disorder, recurrent episode, moderate (H) 2018     Priority: Medium     Peripheral neuropathy, idiopathic 10/02/2017     Priority: Medium     Depression, prolonged 2003     Priority: Medium     Overview:   Major depressive episode with hospitalization.     ,   Past Surgical History:   Procedure Laterality Date     PCI with KATHLEEN to 95% proximal LAD stenosis  2019    [facility]    and   Social History     Tobacco Use     Smoking status: Former Smoker     Types: Cigars     Last attempt to quit: 1980     Years since quittin.9     Smokeless tobacco: Former User     Quit date: 2013   Substance Use Topics     Alcohol use: Yes     Comment: Alcoholic  "Drinks/day: 3-4 times a week     OBJECTIVE:  /82   Pulse 79   Temp 97.7  F (36.5  C) (Tympanic)   Resp 16   Ht 1.803 m (5' 11\")   Wt 94.9 kg (209 lb 3.2 oz)   SpO2 98%   BMI 29.18 kg/m     EXAM:  Alert and cooperative, affect appears appropriate and little bit more broad ranging and relaxed.  No formal thought disorder.    PHQ-9 SCORE 10/2/2017 11/14/2018 6/5/2019   PHQ-9 Total Score 11 12 8       ASSESSMENT/Plan :    Enzo was seen today for recheck.    Diagnoses and all orders for this visit:    Depression, prolonged    Major depressive disorder, recurrent episode, moderate (H)    Coronary artery disease involving native coronary artery of native heart without angina pectoris      Form completed for [employer] insurance.  Scanned for the chart.  Support and encouragement offered with regard to his current conditions.    A total of 15 minutes was spent with the patient, greater than 50% of the time was spent in counseling/discussion of the aforementioned concerns.     Gabriel Jacob MD            "

## 2019-11-19 ENCOUNTER — TELEPHONE (OUTPATIENT)
Dept: FAMILY MEDICINE | Facility: OTHER | Age: 64
End: 2019-11-19

## 2019-11-19 NOTE — TELEPHONE ENCOUNTER
After proper verification, the patient was informed that Him would look for document that was sent down to be scanned and refax it to insurance company.  Sobeida Garcia LPN on 11/19/2019 at 1:46 PM

## 2019-11-19 NOTE — TELEPHONE ENCOUNTER
Pt states that the insurance company did not receive the back side of the form that was supposed to be completed.

## 2019-11-21 ENCOUNTER — TRANSFERRED RECORDS (OUTPATIENT)
Dept: HEALTH INFORMATION MANAGEMENT | Facility: OTHER | Age: 64
End: 2019-11-21

## 2019-11-21 LAB — EJECTION FRACTION: NORMAL %

## 2020-06-02 ENCOUNTER — TRANSFERRED RECORDS (OUTPATIENT)
Dept: HEALTH INFORMATION MANAGEMENT | Facility: OTHER | Age: 65
End: 2020-06-02

## 2020-07-13 ENCOUNTER — VIRTUAL VISIT (OUTPATIENT)
Dept: FAMILY MEDICINE | Facility: OTHER | Age: 65
End: 2020-07-13
Attending: FAMILY MEDICINE
Payer: COMMERCIAL

## 2020-07-13 VITALS — HEIGHT: 71 IN | WEIGHT: 200 LBS | BODY MASS INDEX: 28 KG/M2

## 2020-07-13 DIAGNOSIS — F32.A DEPRESSION, PROLONGED: ICD-10-CM

## 2020-07-13 DIAGNOSIS — F33.1 MAJOR DEPRESSIVE DISORDER, RECURRENT EPISODE, MODERATE (H): Primary | ICD-10-CM

## 2020-07-13 DIAGNOSIS — F34.1 DYSTHYMIC DISORDER: ICD-10-CM

## 2020-07-13 PROCEDURE — 99213 OFFICE O/P EST LOW 20 MIN: CPT | Mod: TEL | Performed by: FAMILY MEDICINE

## 2020-07-13 ASSESSMENT — MIFFLIN-ST. JEOR: SCORE: 1714.32

## 2020-07-13 ASSESSMENT — PAIN SCALES - GENERAL: PAINLEVEL: NO PAIN (0)

## 2020-07-13 ASSESSMENT — PATIENT HEALTH QUESTIONNAIRE - PHQ9: SUM OF ALL RESPONSES TO PHQ QUESTIONS 1-9: 10

## 2020-07-13 NOTE — NURSING NOTE
Patient presents to the clinic today for a televisit for paperwork.  Sarahy Jarquin LPN 7/13/2020   1:02 PM    Chief Complaint   Patient presents with     Forms     Medication Reconciliation: complete  Sarahy Jarquin LPN

## 2020-07-13 NOTE — PROGRESS NOTES
"Enzo Miguel is a 65 year old male who is being evaluated via a billable telephone visit.      The patient has been notified of following:     \"This telephone visit will be conducted via a call between you and your physician/provider. We have found that certain health care needs can be provided without the need for a physical exam.  This service lets us provide the care you need with a short phone conversation.  If a prescription is necessary we can send it directly to your pharmacy.  If lab work is needed we can place an order for that and you can then stop by our lab to have the test done at a later time.    Telephone visits are billed at different rates depending on your insurance coverage. During this emergency period, for some insurers they may be billed the same as an in-person visit.  Please reach out to your insurance provider with any questions.    If during the course of the call the physician/provider feels a telephone visit is not appropriate, you will not be charged for this service.\"    Patient has given verbal consent for Telephone visit?  Yes    What phone number would you like to be contacted at? [phone]    How would you like to obtain your AVS? Declines    Nursing Notes:   Sarahy Jarquin LPN  7/13/2020  1:02 PM  Signed  Patient presents to the clinic today for a televisit for paperwork.  Sarahy Jarquin LPN 7/13/2020   1:02 PM    Chief Complaint   Patient presents with     Forms     Medication Reconciliation: complete  Sarahy Jarquin LPN        Subjective     Enzo Miguel is a 65 year old male who presents via phone visit today for the following health issues:    CARLOS Murry is having a telephone visit today for follow-up of his prolonged major depressive disorder for completion of his disability insurance forms.  I last saw him on November 18, 2019.  He has had a prolonged major depression which has been disabling, severe, recalcitrant.  He has exhausted all his options other than " ECT and is not interested in pursuing that.    He has been staying at his mom's place.  He has a lot of things to get done and has poor motivation and concentration hard time getting things done.  He has been fairly isolated and really has not been doing very much.    PHQ 2018   PHQ-9 Total Score 12 8 10   Q9: Thoughts of better off dead/self-harm past 2 weeks Not at all Not at all Not at all         He developed coronary artery disease and underwent PCI and stenting with a drug-eluting stent last year.  He has chronic fatigue.  He last saw cardiology with a virtual visit on .  His metoprolol was decreased to 12.5 mg daily with no real change in his energy level.  He completed a year of Plavix and continues on aspirin 81 mg lifelong.  He continues on atorvastatin 20 mg daily    Patient Active Problem List   Diagnosis     Dysthymic disorder     Depression, prolonged     Major depressive disorder, recurrent episode, moderate (H)     Peripheral neuropathy, idiopathic     Coronary artery calcification     Past Surgical History:   Procedure Laterality Date     PCI with KATHLEEN to 95% proximal LAD stenosis  2019    [facility]       Social History     Tobacco Use     Smoking status: Former Smoker     Types: Cigars     Last attempt to quit: 1980     Years since quittin.5     Smokeless tobacco: Former User     Quit date: 2013   Substance Use Topics     Alcohol use: Yes     Comment: Alcoholic Drinks/day: 3-4 times a week     Family History   Problem Relation Age of Onset     Other - See Comments Father         Psychiatric illness,committed suicide     Lung Cancer Father         Also had bladder and prostate cancer     Other - See Comments Paternal Grandmother         Psychiatric illness,committed suicide     Other - See Comments Other         Psychiatric illness,Depression     Heart Disease Other         Heart Disease     Thyroid Disease Other         Thyroid Disease      "Other - See Comments Sister          Current Outpatient Medications   Medication Sig Dispense Refill     aspirin (ASA) 81 MG chewable tablet Take 81 mg by mouth daily       Cholecalciferol (VITAMIN D3) 5000 UNIT/ML LIQD Take 5,000 Units by mouth daily       No Known Allergies    Reviewed and updated as needed this visit by Provider         Review of Systems   ROS is negative except as noted above                 Objective   Reported vitals:  Ht 1.803 m (5' 11\")   Wt 90.7 kg (200 lb)   BMI 27.89 kg/m     healthy, alert, no distress, cooperative and fatigued  PSYCH: Alert and oriented times 3; coherent speech, normal   rate and volume, able to articulate logical thoughts, able   to abstract reason, no tangential thoughts, no hallucinations   or delusions  His affect is flat and anxious  RESP: No cough, no audible wheezing, able to talk in full sentences  Remainder of exam unable to be completed due to telephone visits    Diagnostic Test Results:  none         Assessment/Plan:  Enzo was seen today for forms.    Diagnoses and all orders for this visit:    Major depressive disorder, recurrent episode, moderate (H)    Depression, prolonged    Dysthymic disorder      Disability form is completed.  Copy for the chart.  Copy to the patient, copy faxed to [employer] at [phone], and hard copy mailed to them.  Support encouragement offered.    No follow-ups on file.      Phone call duration:  11 minutes    Gabriel Jacob MD        "

## 2021-06-21 ENCOUNTER — TELEPHONE (OUTPATIENT)
Dept: FAMILY MEDICINE | Facility: OTHER | Age: 66
End: 2021-06-21

## 2021-06-21 NOTE — TELEPHONE ENCOUNTER
The patient needs a form filled out. He was told per Gabriel Jacob MD he needs an appointment.  Zayra Ortega LPN..................6/21/2021   12:05 PM

## 2021-06-21 NOTE — TELEPHONE ENCOUNTER
Has some questions about forms, please call, thanks!    Shannan Peterson on 6/21/2021 at 11:18 AM

## 2021-06-29 ENCOUNTER — OFFICE VISIT (OUTPATIENT)
Dept: FAMILY MEDICINE | Facility: OTHER | Age: 66
End: 2021-06-29
Attending: FAMILY MEDICINE
Payer: COMMERCIAL

## 2021-06-29 VITALS
DIASTOLIC BLOOD PRESSURE: 88 MMHG | WEIGHT: 207.4 LBS | BODY MASS INDEX: 29.03 KG/M2 | SYSTOLIC BLOOD PRESSURE: 138 MMHG | TEMPERATURE: 96.8 F | OXYGEN SATURATION: 99 % | HEART RATE: 72 BPM | HEIGHT: 71 IN | RESPIRATION RATE: 16 BRPM

## 2021-06-29 DIAGNOSIS — F32.A DEPRESSION, PROLONGED: ICD-10-CM

## 2021-06-29 DIAGNOSIS — F34.1 DYSTHYMIC DISORDER: ICD-10-CM

## 2021-06-29 DIAGNOSIS — I25.10 CORONARY ARTERY DISEASE INVOLVING NATIVE CORONARY ARTERY OF NATIVE HEART WITHOUT ANGINA PECTORIS: ICD-10-CM

## 2021-06-29 DIAGNOSIS — F33.1 MAJOR DEPRESSIVE DISORDER, RECURRENT EPISODE, MODERATE (H): Primary | ICD-10-CM

## 2021-06-29 PROCEDURE — 99213 OFFICE O/P EST LOW 20 MIN: CPT | Performed by: FAMILY MEDICINE

## 2021-06-29 ASSESSMENT — PAIN SCALES - GENERAL: PAINLEVEL: NO PAIN (0)

## 2021-06-29 ASSESSMENT — ANXIETY QUESTIONNAIRES
5. BEING SO RESTLESS THAT IT IS HARD TO SIT STILL: NOT AT ALL
IF YOU CHECKED OFF ANY PROBLEMS ON THIS QUESTIONNAIRE, HOW DIFFICULT HAVE THESE PROBLEMS MADE IT FOR YOU TO DO YOUR WORK, TAKE CARE OF THINGS AT HOME, OR GET ALONG WITH OTHER PEOPLE: SOMEWHAT DIFFICULT
2. NOT BEING ABLE TO STOP OR CONTROL WORRYING: SEVERAL DAYS
1. FEELING NERVOUS, ANXIOUS, OR ON EDGE: SEVERAL DAYS
GAD7 TOTAL SCORE: 5
3. WORRYING TOO MUCH ABOUT DIFFERENT THINGS: SEVERAL DAYS
7. FEELING AFRAID AS IF SOMETHING AWFUL MIGHT HAPPEN: SEVERAL DAYS
6. BECOMING EASILY ANNOYED OR IRRITABLE: SEVERAL DAYS

## 2021-06-29 ASSESSMENT — PATIENT HEALTH QUESTIONNAIRE - PHQ9
5. POOR APPETITE OR OVEREATING: NOT AT ALL
SUM OF ALL RESPONSES TO PHQ QUESTIONS 1-9: 9

## 2021-06-29 ASSESSMENT — MIFFLIN-ST. JEOR: SCORE: 1742.89

## 2021-06-29 NOTE — NURSING NOTE
"Chief Complaint   Patient presents with     Forms       Initial /88   Pulse 72   Temp 96.8  F (36  C) (Temporal)   Resp 16   Ht 1.803 m (5' 11\")   Wt 94.1 kg (207 lb 6.4 oz)   SpO2 99%   BMI 28.93 kg/m   Estimated body mass index is 28.93 kg/m  as calculated from the following:    Height as of this encounter: 1.803 m (5' 11\").    Weight as of this encounter: 94.1 kg (207 lb 6.4 oz).  Medication Reconciliation: complete    Zayra Ortega LPN  "

## 2021-06-29 NOTE — PROGRESS NOTES
"Nursing Notes:   Zayra Ortega LPN  6/29/2021  3:22 PM  Signed  Chief Complaint   Patient presents with     Forms       Initial /88   Pulse 72   Temp 96.8  F (36  C) (Temporal)   Resp 16   Ht 1.803 m (5' 11\")   Wt 94.1 kg (207 lb 6.4 oz)   SpO2 99%   BMI 28.93 kg/m   Estimated body mass index is 28.93 kg/m  as calculated from the following:    Height as of this encounter: 1.803 m (5' 11\").    Weight as of this encounter: 94.1 kg (207 lb 6.4 oz).  Medication Reconciliation: complete    Zayra Ortgea LPN      SUBJECTIVE:  Enzo Miguel  is a 66 year old male who comes in today for follow-up.  He has had a prolonged major depression which has been disabling, severe, recalcitrant.  He has exhausted all his options other than ECT and is not interested in pursuing that.  He has not been evaluated for ketamine infusions or treatment to my knowledge.  I saw him virtually last summer on July 13.  He needs his disability insurance forms completed.    He developed coronary artery disease and underwent PCI and stenting with a drug-eluting stent 2 years ago.  He has chronic fatigue.  He last saw cardiology with a virtual visit on June 2, 2020.  His metoprolol was decreased to 12.5 mg daily with no real change in his energy level.  He completed a year of Plavix and continues on aspirin 81 mg lifelong.  He was on atorvastatin 20 mg daily. He stopped it when he ran out. We talked about resuming a statin.     [place] [address].  He will at times get ideas of something that he would like to do but then might not actually act on it at all.  He has extreme difficulty with volition and feels chronically depressed.    Past Medical, Family, and Social History reviewed and updated as noted below.   ROS is negative except as noted above       No Known Allergies,   Family History   Problem Relation Age of Onset     Other - See Comments Father         Psychiatric " "illness,committed suicide     Lung Cancer Father         Also had bladder and prostate cancer     Other - See Comments Paternal Grandmother         Psychiatric illness,committed suicide     Other - See Comments Other         Psychiatric illness,Depression     Heart Disease Other         Heart Disease     Thyroid Disease Other         Thyroid Disease     Other - See Comments Sister    ,   Current Outpatient Medications   Medication     aspirin (ASA) 81 MG chewable tablet     Cholecalciferol (VITAMIN D3) 5000 UNIT/ML LIQD     No current facility-administered medications for this visit.    ,   Past Medical History:   Diagnosis Date     Coronary artery calcification      Depression      Vitamin D deficiency    ,   Patient Active Problem List    Diagnosis Date Noted     Coronary artery calcification      Priority: Medium     Dysthymic disorder 2018     Priority: Medium     Major depressive disorder, recurrent episode, moderate (H) 2018     Priority: Medium     Peripheral neuropathy, idiopathic 10/02/2017     Priority: Medium     Depression, prolonged 2003     Priority: Medium     Overview:   Major depressive episode with hospitalization.     ,   Past Surgical History:   Procedure Laterality Date     PCI with KATHLEEN to 95% proximal LAD stenosis  2019    [facility]    and   Social History     Tobacco Use     Smoking status: Former Smoker     Types: Cigars     Quit date: 1980     Years since quittin.5     Smokeless tobacco: Former User     Quit date: 2013   Substance Use Topics     Alcohol use: Yes     Comment: Alcoholic Drinks/day: 3-4 times a week     OBJECTIVE:  /88   Pulse 72   Temp 96.8  F (36  C) (Temporal)   Resp 16   Ht 1.803 m (5' 11\")   Wt 94.1 kg (207 lb 6.4 oz)   SpO2 99%   BMI 28.93 kg/m     EXAM:  Alert cooperative, no distress.  Affect is flat.  He has no formal thought disorder.  Currently no homicidal or suicidal ideations.    PHQ 2019 " 6/29/2021   PHQ-9 Total Score 8 10 9   Q9: Thoughts of better off dead/self-harm past 2 weeks Not at all Not at all Not at all     CARLOS-7 SCORE 8/9/2016 10/2/2017 6/29/2021   Total Score 2 1 5         ASSESSMENT/Plan :    Enzo was seen today for forms.    Diagnoses and all orders for this visit:    Major depressive disorder, recurrent episode, moderate (H)    Depression, prolonged    Dysthymic disorder      He continues to be significantly disabled by his severe recalcitrant depression.  We had a discussion today with regard to ketamine infusions which might offer a new opportunity for improvement for him.  Discussed having him look into this and he could certainly contact [facility] as they are doing the nose spray version.  He also had some questions about low-dose psychedelics which have been talked about in certain research studies but I am not familiar that there is anyone in our area doing those.  We completed his disability form which allows his disability insurance to continue to pay his life insurance premium.    We discussed the importance of being on a statin long-term and he will renew his prescription from cardiology.  Disability form was completed.  Copy for the chart.  Copy to the patient.    A total of 25 minutes was spent with the patient, reviewing records, tests, ordering medications, tests or procedures and documenting clinical information in the EHR.     Gabriel Jacob MD

## 2021-06-30 ASSESSMENT — ANXIETY QUESTIONNAIRES: GAD7 TOTAL SCORE: 5

## 2022-08-22 ENCOUNTER — TELEPHONE (OUTPATIENT)
Dept: FAMILY MEDICINE | Facility: OTHER | Age: 67
End: 2022-08-22

## 2022-08-22 NOTE — TELEPHONE ENCOUNTER
Patient would like to speak with [facility] nurse about forms that were brought in and signed by [facility]. Patient states that they were never received by insurance company. Please call when available.      Jacinta Knight on 8/22/2022 at 9:22 AM

## 2022-08-22 NOTE — TELEPHONE ENCOUNTER
I faxed the form to [employer] insurance for him. He is aware this was done.  Zayra Ortega LPN..................8/22/2022   12:13 PM

## 2023-07-26 ENCOUNTER — LAB (OUTPATIENT)
Dept: FAMILY MEDICINE | Facility: OTHER | Age: 68
End: 2023-07-26

## 2023-07-26 ENCOUNTER — OFFICE VISIT (OUTPATIENT)
Dept: FAMILY MEDICINE | Facility: OTHER | Age: 68
End: 2023-07-26
Attending: FAMILY MEDICINE
Payer: COMMERCIAL

## 2023-07-26 VITALS
TEMPERATURE: 96.5 F | RESPIRATION RATE: 16 BRPM | SYSTOLIC BLOOD PRESSURE: 130 MMHG | DIASTOLIC BLOOD PRESSURE: 80 MMHG | HEART RATE: 65 BPM | WEIGHT: 208 LBS | OXYGEN SATURATION: 95 % | BODY MASS INDEX: 28.17 KG/M2 | HEIGHT: 72 IN

## 2023-07-26 DIAGNOSIS — R73.09 ELEVATED GLUCOSE: ICD-10-CM

## 2023-07-26 DIAGNOSIS — I25.10 CORONARY ARTERY DISEASE INVOLVING NATIVE CORONARY ARTERY OF NATIVE HEART WITHOUT ANGINA PECTORIS: ICD-10-CM

## 2023-07-26 DIAGNOSIS — Z12.11 SPECIAL SCREENING FOR MALIGNANT NEOPLASMS, COLON: ICD-10-CM

## 2023-07-26 DIAGNOSIS — F34.1 DYSTHYMIC DISORDER: ICD-10-CM

## 2023-07-26 DIAGNOSIS — N42.9 PROSTATE DISORDER: ICD-10-CM

## 2023-07-26 DIAGNOSIS — F33.1 MAJOR DEPRESSIVE DISORDER, RECURRENT EPISODE, MODERATE (H): Primary | ICD-10-CM

## 2023-07-26 DIAGNOSIS — F32.A DEPRESSION, PROLONGED: ICD-10-CM

## 2023-07-26 DIAGNOSIS — B36.9 FUNGAL DERMATITIS: ICD-10-CM

## 2023-07-26 DIAGNOSIS — E78.5 DYSLIPIDEMIA: ICD-10-CM

## 2023-07-26 PROCEDURE — 99213 OFFICE O/P EST LOW 20 MIN: CPT | Performed by: FAMILY MEDICINE

## 2023-07-26 RX ORDER — METOPROLOL TARTRATE 25 MG/1
25 TABLET, FILM COATED ORAL 2 TIMES DAILY
COMMUNITY

## 2023-07-26 RX ORDER — TRIAMCINOLONE ACETONIDE 0.25 MG/G
CREAM TOPICAL 2 TIMES DAILY
COMMUNITY
End: 2024-07-18

## 2023-07-26 RX ORDER — KETOCONAZOLE 20 MG/G
CREAM TOPICAL
Qty: 30 G | Refills: 11 | Status: SHIPPED | OUTPATIENT
Start: 2023-07-26 | End: 2024-07-18

## 2023-07-26 RX ORDER — LOSARTAN POTASSIUM 25 MG/1
25 TABLET ORAL DAILY
COMMUNITY

## 2023-07-26 RX ORDER — BENZOCAINE/MENTHOL 6 MG-10 MG
LOZENGE MUCOUS MEMBRANE
COMMUNITY
Start: 2021-10-25 | End: 2024-07-18

## 2023-07-26 RX ORDER — KETOCONAZOLE 20 MG/G
CREAM TOPICAL
COMMUNITY
Start: 2021-10-25 | End: 2023-07-26

## 2023-07-26 RX ORDER — ATORVASTATIN CALCIUM 20 MG/1
10 TABLET, FILM COATED ORAL EVERY EVENING
COMMUNITY
Start: 2022-06-09

## 2023-07-26 ASSESSMENT — ANXIETY QUESTIONNAIRES
GAD7 TOTAL SCORE: 0
IF YOU CHECKED OFF ANY PROBLEMS ON THIS QUESTIONNAIRE, HOW DIFFICULT HAVE THESE PROBLEMS MADE IT FOR YOU TO DO YOUR WORK, TAKE CARE OF THINGS AT HOME, OR GET ALONG WITH OTHER PEOPLE: VERY DIFFICULT
2. NOT BEING ABLE TO STOP OR CONTROL WORRYING: NOT AT ALL
5. BEING SO RESTLESS THAT IT IS HARD TO SIT STILL: NOT AT ALL
6. BECOMING EASILY ANNOYED OR IRRITABLE: NOT AT ALL
1. FEELING NERVOUS, ANXIOUS, OR ON EDGE: NOT AT ALL
3. WORRYING TOO MUCH ABOUT DIFFERENT THINGS: NOT AT ALL
GAD7 TOTAL SCORE: 0
4. TROUBLE RELAXING: NOT AT ALL
7. FEELING AFRAID AS IF SOMETHING AWFUL MIGHT HAPPEN: NOT AT ALL

## 2023-07-26 ASSESSMENT — PATIENT HEALTH QUESTIONNAIRE - PHQ9
SUM OF ALL RESPONSES TO PHQ QUESTIONS 1-9: 9
10. IF YOU CHECKED OFF ANY PROBLEMS, HOW DIFFICULT HAVE THESE PROBLEMS MADE IT FOR YOU TO DO YOUR WORK, TAKE CARE OF THINGS AT HOME, OR GET ALONG WITH OTHER PEOPLE: SOMEWHAT DIFFICULT
SUM OF ALL RESPONSES TO PHQ QUESTIONS 1-9: 9

## 2023-07-26 ASSESSMENT — PAIN SCALES - GENERAL: PAINLEVEL: NO PAIN (0)

## 2023-07-26 NOTE — PROGRESS NOTES
"      Anjana Murry is a 68 year old, presenting for the following health issues:  Forms (Has paperwork to fill out)      7/26/2023     9:25 AM   Additional Questions   Roomed by Sobeida Crane LPN     History of Present Illness       Reason for visit:  Insurance forms and a skin condition    He eats 2-3 servings of fruits and vegetables daily.He consumes 0 sweetened beverage(s) daily.He exercises with enough effort to increase his heart rate 9 or less minutes per day.  He exercises with enough effort to increase his heart rate 3 or less days per week. He is missing 1 dose(s) of medications per week.  He is not taking prescribed medications regularly due to remembering to take.               Review of Systems         Objective    /80 (BP Location: Right arm)   Pulse 65   Temp (!) 96.5  F (35.8  C) (Temporal)   Resp 16   Ht 1.816 m (5' 11.5\")   Wt 94.3 kg (208 lb)   SpO2 95%   BMI 28.61 kg/m    Body mass index is 28.61 kg/m .  Physical Exam                         "

## 2023-07-26 NOTE — PROGRESS NOTES
SUBJECTIVE:  Enzo Miguel  is a 68 year old male who comes in today for follow-up. He needs his disability insurance forms completed.  I have not seen him in person for 2 years.  He has had a prolonged major depression which has been disabling, severe, recalcitrant.  He has exhausted all his options other than ECT and is not interested in pursuing that.  He has not been evaluated for ketamine infusions or treatment to my knowledge. He has extreme difficulty with volition and feels chronically depressed.  We discussed referral for consideration of ketamine infusion versus other types of therapy such as low-dose psychedelics, but he did not look into that any further.    He developed coronary artery disease and underwent PCI and stenting with a drug-eluting stent 4 years ago.  He has chronic fatigue.  He last saw cardiology 12/27/22 His metoprolol was decreased to 12.5 mg daily with no real change in his energy level.  He completed a year of Plavix and continues on aspirin 81 mg lifelong.  He was on atorvastatin 20 mg daily. He stopped it when he ran out. We talked about resuming a statin.  His last measured LDL was 171.    He is not up-to-date on immunizations.  He has not had colon cancer screening.  He has not had PSA.    [place]  He did pretty well in the spring and then got into a funk for a while.  Seems to wax and wane.  Really not interested in any other treatment.    Past Medical, Family, and Social History reviewed and updated as noted below.   ROS is negative except as noted above       No Known Allergies,   Family History   Problem Relation Age of Onset    Other - See Comments Father         Psychiatric illness,committed suicide    Lung Cancer Father         Also had bladder and prostate cancer    Other - See Comments Paternal Grandmother         Psychiatric illness,committed suicide    Other - See Comments Other         Psychiatric illness,Depression    Heart  "Disease Other         Heart Disease    Thyroid Disease Other         Thyroid Disease    Other - See Comments Sister    ,   Current Outpatient Medications   Medication    atorvastatin (LIPITOR) 20 MG tablet    ketoconazole (NIZORAL) 2 % external cream    losartan (COZAAR) 25 MG tablet    metoprolol tartrate (LOPRESSOR) 25 MG tablet    triamcinolone (KENALOG) 0.025 % cream    aspirin (ASA) 81 MG chewable tablet    Cholecalciferol (VITAMIN D3) 5000 UNIT/ML LIQD    hydrocortisone (CORTAID) 1 % external cream     No current facility-administered medications for this visit.   ,   Past Medical History:   Diagnosis Date    Coronary artery calcification     Depression     Vitamin D deficiency    ,   Patient Active Problem List    Diagnosis Date Noted    Coronary artery disease involving native coronary artery of native heart without angina pectoris      Priority: Medium    Dysthymic disorder 2018     Priority: Medium    Major depressive disorder, recurrent episode, moderate (H) 2018     Priority: Medium    Peripheral neuropathy, idiopathic 10/02/2017     Priority: Medium    Depression, prolonged 2003     Priority: Medium     Overview:   Major depressive episode with hospitalization.     ,   Past Surgical History:   Procedure Laterality Date    PCI with KATHLEEN to 95% proximal LAD stenosis  2019    [facility]    and   Social History     Tobacco Use    Smoking status: Former     Types: Cigars     Quit date: 1980     Years since quittin.5     Passive exposure: Past    Smokeless tobacco: Former     Quit date: 2013   Substance Use Topics    Alcohol use: Yes     Comment: Alcoholic Drinks/day: 3-4 times a week     OBJECTIVE:  /80 (BP Location: Right arm)   Pulse 65   Temp (!) 96.5  F (35.8  C) (Temporal)   Resp 16   Ht 1.816 m (5' 11.5\")   Wt 94.3 kg (208 lb)   SpO2 95%   BMI 28.61 kg/m     EXAM:  Alert and cooperative, affect is restricted.  He will smile.  At times is anxious.  " No formal thought disorder.  ASSESSMENT/Plan :    Enzo was seen today for forms.    Diagnoses and all orders for this visit:    Major depressive disorder, recurrent episode, moderate (H)    Dysthymic disorder  -     TSH with free T4 reflex; Future    Depression, prolonged  -     TSH with free T4 reflex; Future    Coronary artery disease involving native coronary artery of native heart without angina pectoris  -     CBC with Platelets & Differential; Future  -     Comprehensive metabolic panel; Future  -     Lipid Profile; Future    Elevated glucose  -     Hemoglobin A1c; Future    Dyslipidemia  -     Comprehensive metabolic panel; Future  -     Lipid Profile; Future    Prostate disorder  -     PSA tumor marker; Future    Special screening for malignant neoplasms, colon  -     COLOGUARD([employer]); Future        We completed his disability form which allows his disability insurance to continue to pay his life insurance premium.     Lengthy discussion with regard to health maintenance issues.  Recommend that he move ahead with some of this and he will think about it.  Cologuard test was sent.  Recommended that he come back in for a wellness physical.  Discussed prostate cancer screening, screening labs for other conditions that could be impacting his mental health and overall sense of wellbeing.  He will think about this and let us know.  Gabriel Jacob MD        Answers submitted by the patient for this visit:  General Questionnaire (Submitted on 7/24/2023)  Chief Complaint: Chronic problems general questions HPI Form  What is the reason for your visit today? : Insurance forms and a skin condition  How many servings of fruits and vegetables do you eat daily?: 2-3  On average, how many sweetened beverages do you drink each day (Examples: soda, juice, sweet tea, etc.  Do NOT count diet or artificially sweetened beverages)?: 0  How many minutes a day do you exercise enough to make your heart beat faster?: 9 or  less  How many days a week do you exercise enough to make your heart beat faster?: 3 or less  How many days per week do you miss taking your medication?: 1  What makes it hard for you to take your medication every day?: remembering to take

## 2023-07-26 NOTE — NURSING NOTE
"Chief Complaint   Patient presents with    Forms     Has paperwork to fill out     Patient is here to fill out yearly disability form.  Initial /80 (BP Location: Right arm)   Pulse 65   Temp (!) 96.5  F (35.8  C) (Temporal)   Resp 16   Ht 1.816 m (5' 11.5\")   Wt 94.3 kg (208 lb)   SpO2 95%   BMI 28.61 kg/m   Estimated body mass index is 28.61 kg/m  as calculated from the following:    Height as of this encounter: 1.816 m (5' 11.5\").    Weight as of this encounter: 94.3 kg (208 lb).  Medication Reconciliation: complete    Sobeida Crane LPN   "

## 2023-07-30 ENCOUNTER — HEALTH MAINTENANCE LETTER (OUTPATIENT)
Age: 68
End: 2023-07-30

## 2023-08-08 ENCOUNTER — PATIENT OUTREACH (OUTPATIENT)
Dept: FAMILY MEDICINE | Facility: OTHER | Age: 68
End: 2023-08-08
Payer: COMMERCIAL

## 2023-08-08 NOTE — TELEPHONE ENCOUNTER
Patient Quality Outreach    Patient is due for the following:   Colon Cancer Screening    Next Steps:   No follow up needed at this time.    Type of outreach:    Phone, spoke to patient/parent. Patient verified name and . Informed pt that we are calling in regards to receiving notification that he has not completed his cologuard. Pt stated that he has been out of town but will complete it when he gets back. Reminded pt that the sample is only good for 4 days so we recommend shipping it on the same day it is obtained (not Friday). Pt voiced understanding and stated that he didn't have any further questions and would call if he has any.       Questions for provider review:    None           Maru Gibson

## 2024-07-18 ENCOUNTER — OFFICE VISIT (OUTPATIENT)
Dept: FAMILY MEDICINE | Facility: OTHER | Age: 69
End: 2024-07-18
Attending: STUDENT IN AN ORGANIZED HEALTH CARE EDUCATION/TRAINING PROGRAM
Payer: COMMERCIAL

## 2024-07-18 VITALS
DIASTOLIC BLOOD PRESSURE: 82 MMHG | TEMPERATURE: 97.2 F | OXYGEN SATURATION: 96 % | HEART RATE: 62 BPM | HEIGHT: 71 IN | RESPIRATION RATE: 16 BRPM | SYSTOLIC BLOOD PRESSURE: 132 MMHG | BODY MASS INDEX: 30.02 KG/M2 | WEIGHT: 214.4 LBS

## 2024-07-18 DIAGNOSIS — I10 PRIMARY HYPERTENSION: ICD-10-CM

## 2024-07-18 DIAGNOSIS — R06.83 SNORES: ICD-10-CM

## 2024-07-18 DIAGNOSIS — Z12.11 COLON CANCER SCREENING: ICD-10-CM

## 2024-07-18 DIAGNOSIS — E55.9 VITAMIN D DEFICIENCY: ICD-10-CM

## 2024-07-18 DIAGNOSIS — Z95.5 PRESENCE OF DRUG COATED STENT IN ANTERIOR DESCENDING BRANCH OF LEFT CORONARY ARTERY: ICD-10-CM

## 2024-07-18 DIAGNOSIS — Z12.12 ENCOUNTER FOR SCREENING FOR MALIGNANT NEOPLASM OF RECTUM: ICD-10-CM

## 2024-07-18 DIAGNOSIS — E66.3 OVERWEIGHT WITH BODY MASS INDEX (BMI) OF 29 TO 29.9 IN ADULT: ICD-10-CM

## 2024-07-18 DIAGNOSIS — I25.10 CORONARY ARTERY DISEASE INVOLVING NATIVE CORONARY ARTERY OF NATIVE HEART WITHOUT ANGINA PECTORIS: ICD-10-CM

## 2024-07-18 DIAGNOSIS — F33.1 MAJOR DEPRESSIVE DISORDER, RECURRENT EPISODE, MODERATE (H): Primary | ICD-10-CM

## 2024-07-18 DIAGNOSIS — Z13.6 ENCOUNTER FOR ABDOMINAL AORTIC ANEURYSM (AAA) SCREENING: ICD-10-CM

## 2024-07-18 DIAGNOSIS — E78.2 MIXED HYPERLIPIDEMIA: ICD-10-CM

## 2024-07-18 DIAGNOSIS — R53.83 OTHER FATIGUE: ICD-10-CM

## 2024-07-18 DIAGNOSIS — F34.1 DYSTHYMIC DISORDER: ICD-10-CM

## 2024-07-18 DIAGNOSIS — Z87.891 FORMER SMOKER: ICD-10-CM

## 2024-07-18 DIAGNOSIS — R73.03 PREDIABETES: ICD-10-CM

## 2024-07-18 DIAGNOSIS — F32.A DEPRESSION, PROLONGED: ICD-10-CM

## 2024-07-18 DIAGNOSIS — N18.2 CKD (CHRONIC KIDNEY DISEASE) STAGE 2, GFR 60-89 ML/MIN: ICD-10-CM

## 2024-07-18 PROBLEM — I20.0 UNSTABLE ANGINA (H): Status: RESOLVED | Noted: 2019-05-29 | Resolved: 2024-07-18

## 2024-07-18 PROCEDURE — 99417 PROLNG OP E/M EACH 15 MIN: CPT | Performed by: STUDENT IN AN ORGANIZED HEALTH CARE EDUCATION/TRAINING PROGRAM

## 2024-07-18 PROCEDURE — 99215 OFFICE O/P EST HI 40 MIN: CPT | Performed by: STUDENT IN AN ORGANIZED HEALTH CARE EDUCATION/TRAINING PROGRAM

## 2024-07-18 PROCEDURE — G2211 COMPLEX E/M VISIT ADD ON: HCPCS | Performed by: STUDENT IN AN ORGANIZED HEALTH CARE EDUCATION/TRAINING PROGRAM

## 2024-07-18 ASSESSMENT — PAIN SCALES - GENERAL: PAINLEVEL: NO PAIN (0)

## 2024-07-18 ASSESSMENT — PATIENT HEALTH QUESTIONNAIRE - PHQ9
SUM OF ALL RESPONSES TO PHQ QUESTIONS 1-9: 7
SUM OF ALL RESPONSES TO PHQ QUESTIONS 1-9: 7
10. IF YOU CHECKED OFF ANY PROBLEMS, HOW DIFFICULT HAVE THESE PROBLEMS MADE IT FOR YOU TO DO YOUR WORK, TAKE CARE OF THINGS AT HOME, OR GET ALONG WITH OTHER PEOPLE: SOMEWHAT DIFFICULT

## 2024-07-18 NOTE — NURSING NOTE
"Chief Complaint   Patient presents with    Forms       Initial /82 (BP Location: Right arm, Patient Position: Chair, Cuff Size: Adult Large)   Pulse 62   Temp 97.2  F (36.2  C) (Temporal)   Resp 16   Ht 1.803 m (5' 11\")   Wt 97.3 kg (214 lb 6.4 oz)   SpO2 96%   BMI 29.90 kg/m   Estimated body mass index is 29.9 kg/m  as calculated from the following:    Height as of this encounter: 1.803 m (5' 11\").    Weight as of this encounter: 97.3 kg (214 lb 6.4 oz).      Medication Reconciliation: Complete.       Donna Vick LPN on 7/18/2024 at 10:04 AM     "

## 2024-07-18 NOTE — PROGRESS NOTES
"  Assessment & Plan     (F33.1) Major depressive disorder, recurrent episode, moderate (H)  (primary encounter diagnosis)  Comment: reportedly has tried multiple medications which caused side effects - \"made life worse.\" Declines referral to therapy bc \"seen all around here\" and ECT.    (F32.A) Depression, prolonged  Comment: to consider gene site testing and ketamine infusion. Also to consider sleep study - discussed how SUYAPA can make conditions including depression hard to treat    (F34.1) Dysthymic disorder  Comment: [place] and [place] other half in town with wife and near grown children    (I25.10) Coronary artery disease involving native coronary artery of native heart without angina pectoris  Comment: Unstable angina resolved with stenting.  No current chest pain but does experience stable angina occasionally.  Follows with cardiology    (Z95.5) Presence of drug coated stent in anterior descending branch of left coronary artery  Comment: On aspirin, atorvastatin, losartan, metoprolol but not consistent with his medicines likely secondary to depression.  Follows with cardiology    (E78.2) Mixed hyperlipidemia  Comment: on atorvastatin 10mg, follow with cardiology    (I10) Primary hypertension  Comment: On losartan and metoprolol but not consistent with his medicines likely secondary to depression. Follows with cardiology    (R53.83) Other fatigue  Comment: Most likely secondary to depression but possibly multifactorial as has multiple chronic conditions. Vit D def untreated and last b12 was borderline insufficient    (R73.03) Prediabetes  Comment: Previously diagnosed; consistently slight hyperglycemia. The last A1c I can find in is 4.9 down from 5.2 in 2021 in 2019 respectively.    (E55.9) Vitamin D deficiency  Comment: insufficient on last labs. Stopped taking D3. Encourage him to at least take D3 OTC 400units daily.  Explained how vitD def contributes to " depression.    (Z87.891) Former smoker  Comment: occasional cigars over 50yrs, quit after stent. Needs AAA screen. Likely needs LDCT - to consider    (Z13.6) Encounter for abdominal aortic aneurysm (AAA) screening  Comment: declines at this time but will consider having done at [facility] with his cardiologist  Plan: Abdominal Aortic Aneurysm Screening/Tracking    (E66.3,  Z68.29) Overweight with body mass index (BMI) of 29 to 29.9 in adult  Comment: encouraged weight loss with healthy diet and gradual increase in activity like brisk walking as well as treating chronic conditions    (R06.83) Snores  Comment: consider sleep study    (Z12.11) Colon cancer screening  Comment: reminded patient due for screen and should complete cologuard or I can order another vs refer to GI for screening colonoscopy    (Z12.12) Encounter for screening for malignant neoplasm of rectum  Comment: reminded patient due for screen and should complete cologuard or I can order another vs refer to GI for screening colonoscopy    (N18.2) CKD (chronic kidney disease) stage 2, GFR 60-89 ml/min  Comment: last gfr 78 in Jan 2024. BNP also elevated in 2022.  Patient reluctant to get labs       Advised testing CBC, CMP, magnesium, phosphorus, A1c, lipids, vitamin D, B12, folate, vitamin C, and maybe viral loads such as herpes/EBV/CMV to determine additional causes of fatigue beyond depression.  Patient declined to have labs stating that his insurance would not cover at our facility and now that his PCP has retired he plans on getting a new PCP at [facility] where his cardiologist is.  Encouraged patient to get not only labs but AAA screen and sleep study. Encourage patient to take medications consistently.   Encouraged patient to stay quit from tobacco and consider LDCT to screen for lung cancer. Encouraged patient to complete colon cancer screening. However, patient is unsure if he would consider treatment if cancer found.    Discussed advance care  "planning; patient states he will have to think it over and decide if he wants to be DNR or to a point someone as a health proxy.  Advance care planning materials provided.  Encouraged patient to extract himself from toxic situations.  Encourage patient to see a therapist - to consider online telehealth with [facility] if not satisfied with anyone local. Consider gene-site testing to find best possible med for him.    BMI  Estimated body mass index is 29.9 kg/m  as calculated from the following:    Height as of this encounter: 1.803 m (5' 11\").    Weight as of this encounter: 97.3 kg (214 lb 6.4 oz).   Weight management plan: Discussed healthy diet and exercise guidelines          Return if symptoms worsen or fail to improve.  Encouraged patient to find a PCP that he trusts at a facility that is in network with his insurance and to have his annual wellness exam and complete the recommended preventive medicine including vaccines, imaging, and labs.    Anjana Murry is a 69 year old, presenting for the following health issues:  Forms        7/18/2024    10:01 AM   Additional Questions   Roomed by Donna cunha LPN   Accompanied by self     History of Present Illness       Reason for visit:  Insurance Forms    He eats 2-3 servings of fruits and vegetables daily.He consumes 0 sweetened beverage(s) daily.He exercises with enough effort to increase his heart rate 9 or less minutes per day.  He exercises with enough effort to increase his heart rate 3 or less days per week.   He is taking medications regularly.   69 year old male who comes in today because needs his disability insurance forms completed.  Former patient of Dr. Jacob, recently retired - last seen 7/26/23. He did not get labs that were ordered done.  He has had a prolonged major depression which has been disabling, severe, recalcitrant. Started in Oct 2003; hospitalized 10/3/03 - 10/7/03.  He has tried most conventional options other than ECT and is " "not interested in pursuing that.  He has not been evaluated for ketamine infusions as recommended by Dr. Jacob. He has extreme difficulty with volition and feels chronically depressed.  Additionally, he did not look further into other types of therapy such as low-dose psychedelics previously discussed by Dr. Jacob.     He developed coronary artery disease and underwent PCI and stenting with a drug-eluting stent 4 years ago, then he completed a year of Plavix and continues on aspirin 81 mg.   He has had waxing and waning fatigue.  Trial of lower metoprolol did not improve energy.   Previous TSH levels normal.  His last measured LDL was 175 in 2021 - Lipid panel ordered by cardiologist today as well as refilled atorvastatin 10mg (prescribed 1/2 tab of a 20mg pill nightly). He did not get sleep study ordered by Cardiology.     He is not up-to-date on immunizations.  He has not had colon cancer screening, LDCT, or AAA screen.  He has not had a recent PSA (normal in 2017).      Really not interested in any other treatment.    Denies current chest pain/pressure/discomfort, palpitations, heart racing, dyspnea, dizziness, lightheadedness, abdominal pain/bloating, or edema.   Admits depression, SI w/o plan, fatigue, snores.    ROS  Review of systems performed; see HPI above for pertinent positives and negatives.        Objective    /82 (BP Location: Right arm, Patient Position: Chair, Cuff Size: Adult Large)   Pulse 62   Temp 97.2  F (36.2  C) (Temporal)   Resp 16   Ht 1.803 m (5' 11\")   Wt 97.3 kg (214 lb 6.4 oz)   SpO2 96%   BMI 29.90 kg/m    Body mass index is 29.9 kg/m .    Physical Exam  Constitutional:       General: He is not in acute distress.     Appearance: He is obese.   HENT:      Head: Normocephalic.      Mouth/Throat:      Mouth: Mucous membranes are moist.      Comments: overcrowding  Eyes:      Extraocular Movements: Extraocular movements intact.   Cardiovascular:      Rate and Rhythm: " Normal rate and regular rhythm.      Heart sounds: No murmur heard.     No friction rub. No gallop.   Pulmonary:      Effort: Pulmonary effort is normal.      Breath sounds: No wheezing, rhonchi or rales.   Abdominal:      General: Bowel sounds are normal.      Tenderness: There is no abdominal tenderness.   Musculoskeletal:      Right lower leg: No edema.      Left lower leg: No edema.   Neurological:      Mental Status: He is alert and oriented to person, place, and time.      Gait: Gait normal.   Psychiatric:         Mood and Affect: Mood is depressed. Affect is tearful.         Speech: Speech normal.         Thought Content: Thought content does not include homicidal or suicidal plan.            Transferred Records on 11/21/2019   Component Date Value Ref Range Status    Ejection Fraction 11/21/2019 55-60  % Final           At least moderate medical decision making considering number of chronic issues, review of internal and external records, filling out paperwork, and continuation of medications.  I spent a total of 168 minutes on the day of the visit.  I spent 54 minutes with the patient and the majority of that time was spent in counseling and education.  Total time spent by me doing chart review, history and exam, documentation and further activities per the note on day of the encounter.    Signed Electronically by: Aaron Hester DO

## 2024-09-21 ENCOUNTER — HEALTH MAINTENANCE LETTER (OUTPATIENT)
Age: 69
End: 2024-09-21

## 2025-07-28 ENCOUNTER — OFFICE VISIT (OUTPATIENT)
Dept: FAMILY MEDICINE | Facility: OTHER | Age: 70
End: 2025-07-28
Attending: FAMILY MEDICINE
Payer: COMMERCIAL

## 2025-07-28 VITALS
HEIGHT: 71 IN | BODY MASS INDEX: 29.82 KG/M2 | WEIGHT: 213 LBS | SYSTOLIC BLOOD PRESSURE: 136 MMHG | TEMPERATURE: 97.8 F | OXYGEN SATURATION: 98 % | RESPIRATION RATE: 12 BRPM | DIASTOLIC BLOOD PRESSURE: 78 MMHG | HEART RATE: 66 BPM

## 2025-07-28 DIAGNOSIS — G60.9 PERIPHERAL NEUROPATHY, IDIOPATHIC: ICD-10-CM

## 2025-07-28 DIAGNOSIS — I10 PRIMARY HYPERTENSION: ICD-10-CM

## 2025-07-28 DIAGNOSIS — I25.10 CORONARY ARTERY DISEASE INVOLVING NATIVE CORONARY ARTERY OF NATIVE HEART WITHOUT ANGINA PECTORIS: ICD-10-CM

## 2025-07-28 DIAGNOSIS — F33.1 MAJOR DEPRESSIVE DISORDER, RECURRENT EPISODE, MODERATE (H): Primary | ICD-10-CM

## 2025-07-28 DIAGNOSIS — E78.2 MIXED HYPERLIPIDEMIA: ICD-10-CM

## 2025-07-28 PROCEDURE — 3075F SYST BP GE 130 - 139MM HG: CPT | Performed by: FAMILY MEDICINE

## 2025-07-28 PROCEDURE — 3078F DIAST BP <80 MM HG: CPT | Performed by: FAMILY MEDICINE

## 2025-07-28 PROCEDURE — G0463 HOSPITAL OUTPT CLINIC VISIT: HCPCS

## 2025-07-28 PROCEDURE — 99214 OFFICE O/P EST MOD 30 MIN: CPT | Performed by: FAMILY MEDICINE

## 2025-07-28 PROCEDURE — 1126F AMNT PAIN NOTED NONE PRSNT: CPT | Performed by: FAMILY MEDICINE

## 2025-07-28 RX ORDER — LOSARTAN POTASSIUM 50 MG/1
1 TABLET ORAL DAILY
COMMUNITY
Start: 2025-07-04

## 2025-07-28 RX ORDER — METOPROLOL SUCCINATE 25 MG/1
TABLET, EXTENDED RELEASE ORAL
COMMUNITY
Start: 2025-07-04

## 2025-07-28 ASSESSMENT — PATIENT HEALTH QUESTIONNAIRE - PHQ9
SUM OF ALL RESPONSES TO PHQ QUESTIONS 1-9: 8
10. IF YOU CHECKED OFF ANY PROBLEMS, HOW DIFFICULT HAVE THESE PROBLEMS MADE IT FOR YOU TO DO YOUR WORK, TAKE CARE OF THINGS AT HOME, OR GET ALONG WITH OTHER PEOPLE: SOMEWHAT DIFFICULT
SUM OF ALL RESPONSES TO PHQ QUESTIONS 1-9: 8

## 2025-07-28 ASSESSMENT — PAIN SCALES - GENERAL: PAINLEVEL_OUTOF10: NO PAIN (0)

## 2025-07-28 NOTE — NURSING NOTE
"Chief Complaint   Patient presents with    Forms     Disability forms to continue life insurance    Establish Care       Initial BP (!) 155/93 (BP Location: Right arm, Patient Position: Sitting, Cuff Size: Adult Large)   Pulse 66   Temp 97.8  F (36.6  C) (Temporal)   Resp 12   Ht 1.803 m (5' 11\")   Wt 96.6 kg (213 lb)   SpO2 98%   BMI 29.71 kg/m   Estimated body mass index is 29.71 kg/m  as calculated from the following:    Height as of this encounter: 1.803 m (5' 11\").    Weight as of this encounter: 96.6 kg (213 lb).  Medication Reconciliation: complete        Xiomara Beck, SANDRO    "

## 2025-07-28 NOTE — PROGRESS NOTES
"  Assessment & Plan     Major depressive disorder, recurrent episode, moderate (H)  Stable at this time.  Refractory to previous medications.  He feels like he is managed okay without medications at this time.  He can let us know if he would like to change any treatment course with medications and/or counseling.  I have filled out his disability paperwork as required for insurance purposes and we will get sent out today.    Coronary artery disease involving native coronary artery of native heart without angina pectoris  Stable on medications.  He is due to see cardiology at [facility] so he will make that appointment.  They monitor labs and medication refills    Primary hypertension  Controlled on medications.    Mixed hyperlipidemia  Currently not on statin due to some leg cramp side effects and perceived long-term risks.  I did state that ideally we would want him to be on statins and potential could do a lower dose, but he will discuss that in further detail when he sees cardiology in the near future.    Peripheral neuropathy, idiopathic  No acute issues.    BMI  Estimated body mass index is 29.71 kg/m  as calculated from the following:    Height as of this encounter: 1.803 m (5' 11\").    Weight as of this encounter: 96.6 kg (213 lb).           Anjana Murry is a 70 year old, presenting for the following health issues:  Forms (Disability forms to continue life insurance) and Establish Care        7/28/2025    10:45 AM   Additional Questions   Roomed by SANDRO Solano         7/28/2025   Forms   Any forms needing to be completed Yes     History of Present Illness       Reason for visit:  Yearly Insurance forms    He eats 2-3 servings of fruits and vegetables daily.He consumes 0 sweetened beverage(s) daily.He exercises with enough effort to increase his heart rate 9 or less minutes per day.  He exercises with enough effort to increase his heart rate 3 or less days per week. He is missing 1 dose(s) of " medications per week.      70-year-old male here for follow-up of depression, coronary artery disease, hypertension, hyperlipidemia, and idiopathic peripheral neuropathy.  He mainly is here for yearly updates and to fill out disability paperwork for [employer] insurance.  He has been on disability for years for depression.  He has been on multiple medications in the past refractory to treatment.  He does not feel like he needs to be on medications and has not felt like he needs counseling/therapy at this time.  He has previously declined ECT treatment.  He feels like he manages well on a day-to-day basis and feels stable with everything at this point.  No other changes have occurred in the last in the last year since this was last completed.    He also does have underlying coronary artery disease with stent placement about 6 years ago.  He does follow with cardiology through [facility].  He actually was due to see them this month, but apparently missed that appointment due to confusion on timing.  He will set that up again for the near future.  They typically manage his medication refills and labs.  He has no chest pain.  He does sometimes get a little bit of dyspnea, but he attributes that to some allergies where he will get some chest congestion and nasal allergies.  Some of this is worse with some of the poor air quality and smoke in the air.  He does take medications as below.  He actually has stopped the Lipitor at least a few months ago due to potential side effects.  He felt like he had some leg cramps with it.  He also was concerned about other long-term effects with some of his research and perceived concerns.    No acute issues with peripheral neuropathy.  He otherwise is doing well.  No other complaints.    I have reviewed the patient's medical history in detail and updated the computerized patient record.                  Objective    /78   Pulse 66   Temp 97.8  F (36.6  C) (Temporal)    "Resp 12   Ht 1.803 m (5' 11\")   Wt 96.6 kg (213 lb)   SpO2 98%   BMI 29.71 kg/m    Body mass index is 29.71 kg/m .  Physical Exam   GENERAL: alert and no distress  RESP: lungs clear to auscultation - no rales, rhonchi or wheezes  CV: regular rate and rhythm, normal S1 S2, no S3 or S4, no murmur, click or rub, no peripheral edema           Margin code: Total face-to-face time along with records review, documentation, and disability paperwork completion 34 minutes.    Signed Electronically by: David Walsh MD    "